# Patient Record
Sex: MALE | Race: WHITE
[De-identification: names, ages, dates, MRNs, and addresses within clinical notes are randomized per-mention and may not be internally consistent; named-entity substitution may affect disease eponyms.]

---

## 2018-05-17 ENCOUNTER — HOSPITAL ENCOUNTER (EMERGENCY)
Dept: HOSPITAL 25 - ED | Age: 33
Discharge: HOME | End: 2018-05-17
Payer: COMMERCIAL

## 2018-05-17 VITALS — DIASTOLIC BLOOD PRESSURE: 82 MMHG | SYSTOLIC BLOOD PRESSURE: 135 MMHG

## 2018-05-17 DIAGNOSIS — L03.316: Primary | ICD-10-CM

## 2018-05-17 PROCEDURE — 99282 EMERGENCY DEPT VISIT SF MDM: CPT

## 2018-05-17 NOTE — ED
Skin Complaint





- HPI Summary


HPI Summary: 


Pt. is a 32 y.o male who presents to the ER for drainage and redness to 

umbilicus. Pt. notes he had mild pain to the area x 2-3 days and then noticed 

drainage from umbilicus. Is not a diabetic. He denies fever/chills, abd. pain, N

/V. Symptoms are mild in severity. Touching affected area makes symptoms worse. 

Rest makes symptoms better. 








- History of Current Complaint


Chief Complaint: EDRashSkinAbscess


Time Seen by Provider: 05/17/18 08:50


Stated Complaint: ABNORMAL BLEEDING


Hx Obtained From: Patient


Pain Intensity: 2





- Allergy/Home Medications


Allergies/Adverse Reactions: 


 Allergies











Allergy/AdvReac Type Severity Reaction Status Date / Time


 


No Known Allergies Allergy   Verified 05/17/18 08:43














PMH/Surg Hx/FS Hx/Imm Hx


Previously Healthy: Yes


Infectious Disease History: No


Infectious Disease History: 


   Denies: Traveled Outside the [place] in Last 30 Days





Review of Systems


Constitutional: Negative


Negative: Fever, Chills


Gastrointestinal: Negative


Negative: Abdominal Pain, Vomiting, Nausea


Positive: Other - Redness and drainage to abdomen. 


All Other Systems Reviewed And Are Negative: Yes





Physical Exam


Triage Information Reviewed: Yes


Vital Signs On Initial Exam: 


 Initial Vitals











Temp Pulse Resp BP Pulse Ox


 


 98.6 F   75   17   135/82   97 


 


 05/17/18 08:43  05/17/18 08:43  05/17/18 08:43  05/17/18 08:43  05/17/18 08:43











Vital Signs Reviewed: Yes


Appearance: Positive: Well-Appearing - Pt. sitting on bed in NAD.


Skin: Positive: Warm, Dry


Head/Face: Positive: Normal Head/Face Inspection


Eyes: Positive: Normal, JASPREET


Neck: Positive: Supple


Abdomen Description: Positive: Other: - Morbidly obese.  Abdomen is soft 

nontender throughout.  Noted from the umbilicus there is a bloody purulent 

discharge.  Mild surrounding cellulitis noted to the skin.  No induration or 

fluctuance.





Diagnostics





- Vital Signs


 Vital Signs











  Temp Pulse Resp BP Pulse Ox


 


 05/17/18 08:43  98.6 F  75  17  135/82  97














- Laboratory


Lab Statement: Any lab studies that have been ordered have been reviewed, and 

results considered in the medical decision making process.





Course/Dx





- Course


Course Of Treatment: Pt. presenting for redness and drainage from umbilicus. He 

is afebrile and well appearing. I do not appreciate abscess or induration on 

exam. He has no deep abdominal pain on palpation. Will treat with keflex and 

bactrim (will cover for MRSA). Advised to schedule wound check with PCP in 2-3 

days. To apply warm compress. To return to ER for fever, increased redness, 

swelling pain or if concerned. Pt. understands and agrees with plan.





- Differential Diagnoses - Skin Complaint


Differential Diagnoses: Abscess, Cellulitis





- Diagnoses


Provider Diagnoses: 


 Wound infection, Cellulitis








Discharge





- Sign-Out/Discharge


Documenting (check all that apply): Discharge/Admit/Transfer





- Discharge Plan


Condition: Good


Disposition: HOME


Prescriptions: 


Cephalexin CAP* [Keflex CAP*] 500 mg PO QID #40 cap


Sulfamethox/Trimethoprim DS* [Bactrim /160 TAB*] 1 tab PO BID #20 tab


Patient Education Materials:  Wound Infection (ED), Cellulitis (ED)


Referrals: 


Edwin Ayala DO [Primary Care Provider] - 


Additional Instructions: 


Call your PCP today to schedule an appointment for wound check in 2-3 days


Take antibiotics as directed


Keep area clean and dry


Return to ER for increased redness, swelling, drainage, fever, vomiting or if 

concerned








- Billing Disposition and Condition


Condition: GOOD


Disposition: HOME